# Patient Record
Sex: FEMALE | Employment: UNEMPLOYED | URBAN - METROPOLITAN AREA
[De-identification: names, ages, dates, MRNs, and addresses within clinical notes are randomized per-mention and may not be internally consistent; named-entity substitution may affect disease eponyms.]

---

## 2018-03-09 ENCOUNTER — HOSPITAL ENCOUNTER (OUTPATIENT)
Dept: PREADMISSION TESTING | Age: 38
Discharge: HOME | End: 2018-03-09
Payer: MEDICAID

## 2018-03-09 VITALS
OXYGEN SATURATION: 100 % | WEIGHT: 293 LBS | RESPIRATION RATE: 18 BRPM | HEIGHT: 67 IN | TEMPERATURE: 98.3 F | BODY MASS INDEX: 45.99 KG/M2 | HEART RATE: 80 BPM

## 2018-03-09 LAB
ALBUMIN SERPL-MCNC: 3.3 G/DL (ref 3.5–5)
ALBUMIN/GLOB SERPL: 0.9 {RATIO} (ref 1.1–2.2)
ALP SERPL-CCNC: 89 U/L (ref 45–117)
ALT SERPL-CCNC: 22 U/L (ref 12–78)
ANION GAP SERPL CALC-SCNC: 4 MMOL/L (ref 5–15)
AST SERPL-CCNC: 20 U/L (ref 15–37)
BILIRUB SERPL-MCNC: 0.2 MG/DL (ref 0.2–1)
BUN SERPL-MCNC: 8 MG/DL (ref 6–20)
BUN/CREAT SERPL: 7 (ref 12–20)
CALCIUM SERPL-MCNC: 9 MG/DL (ref 8.5–10.1)
CHLORIDE SERPL-SCNC: 107 MMOL/L (ref 97–108)
CO2 SERPL-SCNC: 32 MMOL/L (ref 21–32)
CREAT SERPL-MCNC: 1.18 MG/DL (ref 0.55–1.02)
ERYTHROCYTE [DISTWIDTH] IN BLOOD BY AUTOMATED COUNT: 16.2 % (ref 11.5–14.5)
GLOBULIN SER CALC-MCNC: 3.6 G/DL (ref 2–4)
GLUCOSE SERPL-MCNC: 95 MG/DL (ref 65–100)
HCT VFR BLD AUTO: 37.6 % (ref 35–47)
HGB BLD-MCNC: 11.8 G/DL (ref 11.5–16)
MCH RBC QN AUTO: 27 PG (ref 26–34)
MCHC RBC AUTO-ENTMCNC: 31.4 G/DL (ref 30–36.5)
MCV RBC AUTO: 86 FL (ref 80–99)
NRBC # BLD: 0 K/UL (ref 0–0.01)
NRBC BLD-RTO: 0 PER 100 WBC
PLATELET # BLD AUTO: 314 K/UL (ref 150–400)
PMV BLD AUTO: 9.2 FL (ref 8.9–12.9)
POTASSIUM SERPL-SCNC: 4.8 MMOL/L (ref 3.5–5.1)
PROT SERPL-MCNC: 6.9 G/DL (ref 6.4–8.2)
RBC # BLD AUTO: 4.37 M/UL (ref 3.8–5.2)
SODIUM SERPL-SCNC: 143 MMOL/L (ref 136–145)
WBC # BLD AUTO: 7.7 K/UL (ref 3.6–11)

## 2018-03-09 PROCEDURE — 80053 COMPREHEN METABOLIC PANEL: CPT | Performed by: ANESTHESIOLOGY

## 2018-03-09 PROCEDURE — 36415 COLL VENOUS BLD VENIPUNCTURE: CPT | Performed by: ANESTHESIOLOGY

## 2018-03-09 PROCEDURE — 85027 COMPLETE CBC AUTOMATED: CPT | Performed by: ANESTHESIOLOGY

## 2018-03-09 RX ORDER — METHADONE HYDROCHLORIDE 10 MG/ML
10 CONCENTRATE ORAL
COMMUNITY

## 2018-03-09 RX ORDER — ALPRAZOLAM 1 MG/1
1 TABLET ORAL
COMMUNITY

## 2018-03-09 NOTE — PERIOP NOTES
[facility]  Preoperative Instructions      Surgery Date 3/12/2018              Time of Arrival  10:00    1. On the day of your surgery, please report to the [facility] Entrance. If arriving prior to 7 AM please enter through the Emergency Room Entrance. 2. You must have a responsible adult to drive you to the hospital, stay at the hospital during your surgery and drive you home. You should have someone stay with you for the first 24 hours after your surgery. You should not drive a car for 24 hours following surgery. 3. Do not have anything to eat or drink ( including water, gum, mints, coffee, juice) after midnight . This may not apply to medications prescribed by your physician. Please note special instructions, if applicable. If you are currently taking Plavix, Coumadin, or other blood-thinning agents, contact your surgeon for instructions. 4. We recommend you do not drink any alcoholic beverages for 24 hours before and after your surgery. 5. Have a list of all current medications, including vitamins, herbal supplements and any other over the counter medications. Stop Asprin and non-steroidal anti-inflammatory drugs (I.e. Advil, Aleve) as directed by your surgeon's office. Stop all vitamins and herbal supplements seven days prior to your surgery. 6. Wear comfortable clothes. Wear glasses instead of contacts. Do not bring any money or jewelry. Do not wear make-up, particularly mascara, the morning of your surgery. Do not wear nail polish, particularly if you are having foot /hand surgery. Wear your hair loose or down, no ponytails, buns, emmy pins or clips. All body piercings must be removed. Please shower before surgery with an antibacterial soap (Safeguard/Dial) and use a clean towel. Do not apply any lotions, powders, cologne, perfume or deodorants afterward.     Do not shave the area around your surgical incision for at least two to three days prior to your surgery. If you wear glasses, contacts, dentures and/or hearing aids, they will be removed prior to surgery. 7. You should understand that if you do not follow these instructions your surgery may be cancelled. If your physical condition changes (I.e. fever, cold or flu) please contact your surgeon as soon as possible. 8. It is important that you be on time. If a situation occurs where you may be late, please call [phone]    9. If you are feeling sick before surgery, call your surgeon. He or she will tell you what to do. If you are sick on the day of your surgery please call [phone].     10. If you have any questions and or problems, please call [phone] (Pre-admission Testing). 11. Your surgery time may be subject to change. You will receive a phone call the evening before your surgery if your time changes. Special Instructions: may take xanax if needed the morning of surgery    MEDICATIONS TO TAKE THE MORNING OF SURGERY WITH A SIP OF WATER:      I understand a pre-operative phone call will be made to verify my surgery time.   In the event that I am not available, I give permission for a message to be left on my answering service and/or with another person?             ___________________      ___________________  _________  (Signature of Patient)          (Witness)                              (Date and Time)

## 2018-03-12 ENCOUNTER — ANESTHESIA (OUTPATIENT)
Dept: SURGERY | Age: 38
End: 2018-03-12
Payer: MEDICAID

## 2018-03-12 ENCOUNTER — HOSPITAL ENCOUNTER (OUTPATIENT)
Age: 38
Setting detail: OUTPATIENT SURGERY
Discharge: HOME | End: 2018-03-12
Attending: PODIATRIST | Admitting: PODIATRIST
Payer: MEDICAID

## 2018-03-12 ENCOUNTER — ANESTHESIA EVENT (OUTPATIENT)
Dept: SURGERY | Age: 38
End: 2018-03-12
Payer: MEDICAID

## 2018-03-12 VITALS
TEMPERATURE: 97.8 F | SYSTOLIC BLOOD PRESSURE: 139 MMHG | DIASTOLIC BLOOD PRESSURE: 88 MMHG | RESPIRATION RATE: 19 BRPM | HEART RATE: 85 BPM | OXYGEN SATURATION: 92 %

## 2018-03-12 PROBLEM — L60.0 INGROWN TOENAIL: Status: ACTIVE | Noted: 2018-03-12

## 2018-03-12 LAB
AMPHET UR QL SCN: NEGATIVE
BARBITURATES UR QL SCN: NEGATIVE
BENZODIAZ UR QL: POSITIVE
CANNABINOIDS UR QL SCN: NEGATIVE
COCAINE UR QL SCN: NEGATIVE
DRUG SCRN COMMENT,DRGCM: ABNORMAL
METHADONE UR QL: POSITIVE
OPIATES UR QL: NEGATIVE
PCP UR QL: NEGATIVE

## 2018-03-12 PROCEDURE — 76060000031 HC ANESTHESIA FIRST 0.5 HR: Performed by: PODIATRIST

## 2018-03-12 PROCEDURE — 76210000021 HC REC RM PH II 0.5 TO 1 HR: Performed by: PODIATRIST

## 2018-03-12 PROCEDURE — 74011000250 HC RX REV CODE- 250: Performed by: PODIATRIST

## 2018-03-12 PROCEDURE — 77030012406 HC DRN WND PENRS BARD -A: Performed by: PODIATRIST

## 2018-03-12 PROCEDURE — 74011250636 HC RX REV CODE- 250/636

## 2018-03-12 PROCEDURE — 80307 DRUG TEST PRSMV CHEM ANLYZR: CPT | Performed by: PODIATRIST

## 2018-03-12 PROCEDURE — 76010000154 HC OR TIME FIRST 0.5 HR: Performed by: PODIATRIST

## 2018-03-12 RX ORDER — KETOROLAC TROMETHAMINE 30 MG/ML
INJECTION, SOLUTION INTRAMUSCULAR; INTRAVENOUS AS NEEDED
Status: DISCONTINUED | OUTPATIENT
Start: 2018-03-12 | End: 2018-03-12 | Stop reason: HOSPADM

## 2018-03-12 RX ORDER — CEFAZOLIN SODIUM IN 0.9 % NACL 2 G/100 ML
PLASTIC BAG, INJECTION (ML) INTRAVENOUS
Status: DISCONTINUED
Start: 2018-03-12 | End: 2018-03-12 | Stop reason: HOSPADM

## 2018-03-12 RX ORDER — SODIUM CHLORIDE 9 MG/ML
50 INJECTION, SOLUTION INTRAVENOUS CONTINUOUS
Status: DISCONTINUED | OUTPATIENT
Start: 2018-03-12 | End: 2018-03-12 | Stop reason: HOSPADM

## 2018-03-12 RX ORDER — FENTANYL CITRATE 50 UG/ML
25 INJECTION, SOLUTION INTRAMUSCULAR; INTRAVENOUS
Status: DISCONTINUED | OUTPATIENT
Start: 2018-03-12 | End: 2018-03-12 | Stop reason: HOSPADM

## 2018-03-12 RX ORDER — CEFAZOLIN SODIUM 1 G/3ML
INJECTION, POWDER, FOR SOLUTION INTRAMUSCULAR; INTRAVENOUS
Status: DISCONTINUED
Start: 2018-03-12 | End: 2018-03-12 | Stop reason: HOSPADM

## 2018-03-12 RX ORDER — SODIUM CHLORIDE, SODIUM LACTATE, POTASSIUM CHLORIDE, CALCIUM CHLORIDE 600; 310; 30; 20 MG/100ML; MG/100ML; MG/100ML; MG/100ML
50 INJECTION, SOLUTION INTRAVENOUS CONTINUOUS
Status: DISCONTINUED | OUTPATIENT
Start: 2018-03-12 | End: 2018-03-12 | Stop reason: HOSPADM

## 2018-03-12 RX ORDER — SODIUM CHLORIDE 0.9 % (FLUSH) 0.9 %
5-10 SYRINGE (ML) INJECTION EVERY 8 HOURS
Status: DISCONTINUED | OUTPATIENT
Start: 2018-03-12 | End: 2018-03-12 | Stop reason: HOSPADM

## 2018-03-12 RX ORDER — SODIUM CHLORIDE 0.9 % (FLUSH) 0.9 %
5-10 SYRINGE (ML) INJECTION AS NEEDED
Status: DISCONTINUED | OUTPATIENT
Start: 2018-03-12 | End: 2018-03-12 | Stop reason: HOSPADM

## 2018-03-12 RX ORDER — IBUPROFEN 200 MG
800 TABLET ORAL
Qty: 90 TAB | Refills: 1 | Status: SHIPPED | OUTPATIENT
Start: 2018-03-12 | End: 2018-08-17

## 2018-03-12 RX ORDER — BUPIVACAINE HYDROCHLORIDE AND EPINEPHRINE 5; 5 MG/ML; UG/ML
20 INJECTION, SOLUTION EPIDURAL; INTRACAUDAL; PERINEURAL ONCE
Status: COMPLETED | OUTPATIENT
Start: 2018-03-12 | End: 2018-03-12

## 2018-03-12 RX ORDER — LIDOCAINE HYDROCHLORIDE 10 MG/ML
0.1 INJECTION, SOLUTION EPIDURAL; INFILTRATION; INTRACAUDAL; PERINEURAL AS NEEDED
Status: DISCONTINUED | OUTPATIENT
Start: 2018-03-12 | End: 2018-03-12 | Stop reason: HOSPADM

## 2018-03-12 RX ORDER — SULFAMETHOXAZOLE AND TRIMETHOPRIM 800; 160 MG/1; MG/1
1 TABLET ORAL 2 TIMES DAILY
Qty: 20 TAB | Refills: 1 | Status: SHIPPED | OUTPATIENT
Start: 2018-03-12 | End: 2018-08-17

## 2018-03-12 RX ORDER — SODIUM CHLORIDE 900 MG/100ML
INJECTION INTRAVENOUS
Status: DISCONTINUED
Start: 2018-03-12 | End: 2018-03-12 | Stop reason: HOSPADM

## 2018-03-12 RX ORDER — PROPOFOL 10 MG/ML
INJECTION, EMULSION INTRAVENOUS AS NEEDED
Status: DISCONTINUED | OUTPATIENT
Start: 2018-03-12 | End: 2018-03-12 | Stop reason: HOSPADM

## 2018-03-12 RX ORDER — MIDAZOLAM HYDROCHLORIDE 1 MG/ML
INJECTION, SOLUTION INTRAMUSCULAR; INTRAVENOUS AS NEEDED
Status: DISCONTINUED | OUTPATIENT
Start: 2018-03-12 | End: 2018-03-12 | Stop reason: HOSPADM

## 2018-03-12 RX ORDER — HYDROMORPHONE HYDROCHLORIDE 1 MG/ML
0.5 INJECTION, SOLUTION INTRAMUSCULAR; INTRAVENOUS; SUBCUTANEOUS
Status: DISCONTINUED | OUTPATIENT
Start: 2018-03-12 | End: 2018-03-12 | Stop reason: HOSPADM

## 2018-03-12 RX ADMIN — PROPOFOL 10 MG: 10 INJECTION, EMULSION INTRAVENOUS at 11:24

## 2018-03-12 RX ADMIN — PROPOFOL 10 MG: 10 INJECTION, EMULSION INTRAVENOUS at 11:26

## 2018-03-12 RX ADMIN — PROPOFOL 10 MG: 10 INJECTION, EMULSION INTRAVENOUS at 11:19

## 2018-03-12 RX ADMIN — PROPOFOL 10 MG: 10 INJECTION, EMULSION INTRAVENOUS at 11:20

## 2018-03-12 RX ADMIN — PROPOFOL 10 MG: 10 INJECTION, EMULSION INTRAVENOUS at 11:22

## 2018-03-12 RX ADMIN — PROPOFOL 10 MG: 10 INJECTION, EMULSION INTRAVENOUS at 11:25

## 2018-03-12 RX ADMIN — PROPOFOL 10 MG: 10 INJECTION, EMULSION INTRAVENOUS at 11:21

## 2018-03-12 RX ADMIN — MIDAZOLAM HYDROCHLORIDE 2 MG: 1 INJECTION, SOLUTION INTRAMUSCULAR; INTRAVENOUS at 11:07

## 2018-03-12 RX ADMIN — KETOROLAC TROMETHAMINE 15 MG: 30 INJECTION, SOLUTION INTRAMUSCULAR; INTRAVENOUS at 11:31

## 2018-03-12 RX ADMIN — PROPOFOL 100 MG: 10 INJECTION, EMULSION INTRAVENOUS at 11:18

## 2018-03-12 RX ADMIN — PROPOFOL 10 MG: 10 INJECTION, EMULSION INTRAVENOUS at 11:27

## 2018-03-12 NOTE — ANESTHESIA PREPROCEDURE EVALUATION
Anesthetic History   No history of anesthetic complications            Review of Systems / Medical History  Patient summary reviewed and pertinent labs reviewed    Pulmonary          Smoker         Neuro/Psych         Psychiatric history     Cardiovascular                  Exercise tolerance: <4 METS     GI/Hepatic/Renal  Within defined limits              Endo/Other        Morbid obesity and arthritis     Other Findings              Physical Exam    Airway  Mallampati: III    Neck ROM: short neck   Mouth opening: Normal     Cardiovascular    Rhythm: regular  Rate: normal         Dental    Dentition: Upper partial plate     Pulmonary  Breath sounds clear to auscultation               Abdominal  GI exam deferred       Other Findings            Anesthetic Plan    ASA: 3  Anesthesia type: MAC            Anesthetic plan and risks discussed with: Patient

## 2018-03-12 NOTE — H&P
History and Physical    Subjective:     Kendell Conde is a 42 y.o.  female who presents with painful 1st toenails both feet. Onset of symptoms was gradual with rapidly worsening course since that time. The pain is located 1st toenail both feet. Patient describes the pain as continuous and rated as severe. . Patient denies any truama. Symptoms are aggravated by shoes. Past Medical History:   Diagnosis Date    Arthritis     left knee    Morbid obesity ([address]     Psychiatric disorder       Past Surgical History:   Procedure Laterality Date    HX GYN      partial hysterectomy     History reviewed. No pertinent family history. Social History   Substance Use Topics    Smoking status: Current Every Day Smoker     Packs/day: 1.00    Smokeless tobacco: Never Used    Alcohol use Yes      Comment: social       Prior to Admission medications    Medication Sig Start Date End Date Taking? Authorizing Provider   ALPRAZolam Edison Olmos) 1 mg tablet Take 1 mg by mouth. Indications: anxiety    Historical Provider   methadone (DOLOPHINE) 10 mg/mL solution Take 10 mg by mouth every eight (8) hours as needed for Pain. Indications: Chronic Pain, 120mg liquid form    Historical Provider     No Known Allergies     Review of Systems:  A comprehensive review of systems was negative. Objective: Intake and Output:            Physical Exam:   Visit Vitals    LMP 03/09/2018     General:  Alert, cooperative, no distress, appears stated age. Head:  Normocephalic, without obvious abnormality, atraumatic. Eyes:  Conjunctivae/corneas clear. PERRL,    Ears:  Normal external  ear canals both ears. Nose: Nares normal. Septum midline. Mucosa normal. No drainage or sinus tenderness. Throat: Lips, mucosa, and tongue normal. Teeth and gums normal.   Neck: Supple, symmetrical, trachea midline, no adenopathy, thyroid: no enlargement/tenderness/nodules, no carotid bruit and no JVD.    Back:   Symmetric, no curvature. ROM normal. No CVA tenderness. Lungs:   Clear to auscultation bilaterally. Chest wall:  No tenderness or deformity. Heart:  Regular rate and rhythm, S1, S2 normal, no murmur, click, rub or gallop. Extremities: Extremities normal, atraumatic, no cyanosis or edema. Pulses: 2+ and symmetric all extremities. Skin: Skin color, texture, turgor normal. No rashes or lesions. Lymph nodes: Cervical, supraclavicular, and axillary nodes normal.   Neurologic: CNII-XII intact. Normal strength, sensation and reflexes throughout. LOWER EXTREMITIES:  Palpable pedal pulses with epicritic sensation intact  Good muscle  Toenails 1-5 bilateral thick and discolored  Dystrophic, incurvated painful 1st toenail bilateral     Data Review:   No results found for this or any previous visit (from the past 24 hour(s)). Assessment:     Principal Problem:    Ingrown toenail (3/12/2018)      Overview: DX: ingrown toenail 1st toe both feet        Plan:   Scheduled for matrixectomy 1st toenail both feet. Discussed risks, benefit, expected outcome as well as post op course.   All questions answered      Signed By: Jesica Bass DPM     March 12, 2018

## 2018-03-12 NOTE — ANESTHESIA POSTPROCEDURE EVALUATION
Post-Anesthesia Evaluation and Assessment    Patient: Baron Kam MRN: [id number]  SSN: [id number]    YOB: 1980  Age: 42 y.o. Sex: female       Cardiovascular Function/Vital Signs  Visit Vitals    /88    Pulse 85    Temp 36.6 °C (97.8 °F)    Resp 19    SpO2 92%       Patient is status post MAC anesthesia for Procedure(s):  MATRIXECTOMY FIRST TOENAIL BILATERAL . Nausea/Vomiting: None    Postoperative hydration reviewed and adequate. Pain:  Pain Scale 1: Numeric (0 - 10) (03/12/18 1138)  Pain Intensity 1: 0 (03/12/18 1138)   Managed    Neurological Status:   Neuro (WDL): Within Defined Limits (03/12/18 1138)   At baseline    Mental Status and Level of Consciousness: Arousable    Pulmonary Status:   O2 Device: Room air (03/12/18 1140)   Adequate oxygenation and airway patent    Complications related to anesthesia: None    Post-anesthesia assessment completed.  No concerns    Signed By: Major Ward MD     March 12, 2018

## 2018-03-12 NOTE — ROUTINE PROCESS
Report received from Dr. Chelle Dos Santos and Tita Frazier RN.  Anesthesin type, drugs, patient history, complications, estimated blood loss, vital signs, and pain management was reviewed

## 2018-03-12 NOTE — BRIEF OP NOTE
BRIEF OPERATIVE NOTE    Date of Procedure: 3/12/2018   Preoperative Diagnosis: INGROWN TOENAILS FIRST TOE BILATERAL  Postoperative Diagnosis: INGROWN TOENAILS FIRST TOE BILATERAL    Procedure(s):  MATRIXECTOMY FIRST TOENAIL BILATERAL   Surgeon(s) and Role:     * Nixon Rojas DPM - Primary         Assistant Staff: None      Surgical Staff:  Circ-1: Axel Ramires  Scrub RN-1: Maximino Acevedo RN  Event Time In   Incision Start 1120   Incision Close 1130     Anesthesia: MAC   Estimated Blood Loss:min  Specimens: * No specimens in log *   Findings: deformed toenails  Complications: none  Implants: * No implants in log *

## 2018-03-12 NOTE — OP NOTES
[facility]  OPERATIVE REPORT    Dominic Livingston  MR#: [id number]  : 1980  ACCOUNT #: [de-identified]   DATE OF SERVICE: 2018    PREOPERATIVE DIAGNOSIS:  Painful ingrown toenails first toe bilateral.    POSTOPERATIVE DIAGNOSIS:  Painful ingrown toenails first toe bilateral.    PROCEDURE PERFORMED:  Matrixectomy first toenail bilateral.    SURGEON:  Pablo De Paz DPM.     ANESTHESIA:  IV sedation with local.    ESTIMATED BLOOD LOSS:  Minimal.    SPECIMENS REMOVED:  None. COMPLICATIONS:  none    IMPLANTS: none    INDICATIONS:  This 78-year-old white female presents with chronic painful ingrown toenails. Conservative therapy has failed to alleviate the patient of her symptoms. At this time, surgical intervention is opted as a treatment choice by the patient. Medical history and physical has been performed. Patient released for surgery. Physical examination revealed palpable pedal pulses with fairly good muscle strength in lower extremities bilateral.  Epicritic sensation intact. Toenails 1 through 5 bilateral are thickened, discolored. First toenail bilateral is grossly thickened, incurvated and painful. DESCRIPTION OF PROCEDURE:  The patient was brought to the operating room and placed on the operating table in supine position. Both feet were now prepped and draped in usual sterile manner. A successful timeout was completed. Next, each digit was anesthetized while under IV sedation using 10 mL of 0.5% Marcaine with epinephrine in each first toe bilateral.  Next, a Penrose tourniquet was placed around each first toe bilateral.  Using a hemostat, the nail first and left was removed in total including the matrix. Proximal nail fold both digits first toe bilateral, 89% phenol was applied. Bacitracin followed by dry sterile gauze were placed on each digit and the Penrose tourniquet was released.   The patient appeared to tolerate anesthesia and procedure well and she was transferred from the operating room to recovery with all vital signs stable and vascular status intact to all digits 1 through 5 both feet. Preoperatively, she was given 2 grams Ancef IV.       ANTONIETTA Joseph / [employer]  D: 03/12/2018 11:44     T: 03/12/2018 14:29  JOB #: [id number]

## 2018-08-17 ENCOUNTER — HOSPITAL ENCOUNTER (EMERGENCY)
Age: 38
Discharge: HOME | End: 2018-08-17
Attending: EMERGENCY MEDICINE | Admitting: EMERGENCY MEDICINE
Payer: MEDICAID

## 2018-08-17 VITALS
BODY MASS INDEX: 45.99 KG/M2 | HEART RATE: 82 BPM | RESPIRATION RATE: 18 BRPM | DIASTOLIC BLOOD PRESSURE: 85 MMHG | WEIGHT: 293 LBS | TEMPERATURE: 98.5 F | HEIGHT: 67 IN | OXYGEN SATURATION: 97 % | SYSTOLIC BLOOD PRESSURE: 118 MMHG

## 2018-08-17 DIAGNOSIS — T14.8XXA ABRASION OF SKIN WITH INFECTION: ICD-10-CM

## 2018-08-17 DIAGNOSIS — L03.116 CELLULITIS OF LEFT LOWER EXTREMITY: ICD-10-CM

## 2018-08-17 DIAGNOSIS — H60.503 ACUTE OTITIS EXTERNA OF BOTH EARS, UNSPECIFIED TYPE: Primary | ICD-10-CM

## 2018-08-17 DIAGNOSIS — L08.9 ABRASION OF SKIN WITH INFECTION: ICD-10-CM

## 2018-08-17 DIAGNOSIS — M79.89 FOOT SWELLING: ICD-10-CM

## 2018-08-17 PROCEDURE — 93970 EXTREMITY STUDY: CPT

## 2018-08-17 PROCEDURE — 99282 EMERGENCY DEPT VISIT SF MDM: CPT

## 2018-08-17 RX ORDER — CEPHALEXIN 500 MG/1
500 CAPSULE ORAL 4 TIMES DAILY
Qty: 28 CAP | Refills: 0 | Status: SHIPPED | OUTPATIENT
Start: 2018-08-17 | End: 2018-08-24

## 2018-08-17 RX ORDER — IBUPROFEN 600 MG/1
600 TABLET ORAL
Qty: 20 TAB | Refills: 0 | Status: SHIPPED | OUTPATIENT
Start: 2018-08-17

## 2018-08-17 RX ORDER — NEOMYCIN SULFATE, POLYMYXIN B SULFATE, HYDROCORTISONE 3.5; 10000; 1 MG/ML; [USP'U]/ML; MG/ML
3-4 SOLUTION/ DROPS AURICULAR (OTIC) 4 TIMES DAILY
Qty: 1 BOTTLE | Refills: 0 | Status: SHIPPED | OUTPATIENT
Start: 2018-08-17

## 2018-08-17 NOTE — ED NOTES
Pt reports bilateral lower extremity edema \"all the time\", symptoms are intermittent, this episode x 1 week; pt also reports bilateral ear drainage for over a month since swimming at [facility]      Emergency [address] is developed from the Nursing assessment and Emergency Department Attending provider initial evaluation. The plan of care may be reviewed in the ED Provider note.     The Plan of Care was developed with the following considerations:   Patient / Family readiness to learn indicated by:verbalized understanding  Persons(s) to be included in education: patient  Barriers to Learning/Limitations:No    Signed     Oliverio James RN    8/17/2018   1:02 PM

## 2018-08-17 NOTE — DISCHARGE INSTRUCTIONS
Cellulitis: Care Instructions  Your Care Instructions    Cellulitis is a skin infection caused by bacteria, most often strep or staph. It often occurs after a break in the skin from a scrape, cut, bite, or puncture, or after a rash. Cellulitis may be treated without doing tests to find out what caused it. But your doctor may do tests, if needed, to look for a specific bacteria, like methicillin-resistant Staphylococcus aureus (MRSA). The doctor has checked you carefully, but problems can develop later. If you notice any problems or new symptoms, get medical treatment right away. Follow-up care is a key part of your treatment and safety. Be sure to make and go to all appointments, and call your doctor if you are having problems. It's also a good idea to know your test results and keep a list of the medicines you take. How can you care for yourself at home? · Take your antibiotics as directed. Do not stop taking them just because you feel better. You need to take the full course of antibiotics. · Prop up the infected area on pillows to reduce pain and swelling. Try to keep the area above the level of your heart as often as you can. · If your doctor told you how to care for your wound, follow your doctor's instructions. If you did not get instructions, follow this general advice:  ¨ Wash the wound with clean water 2 times a day. Don't use hydrogen peroxide or alcohol, which can slow healing. ¨ You may cover the wound with a thin layer of petroleum jelly, such as Vaseline, and a nonstick bandage. ¨ Apply more petroleum jelly and replace the bandage as needed. · Be safe with medicines. Take pain medicines exactly as directed. ¨ If the doctor gave you a prescription medicine for pain, take it as prescribed. ¨ If you are not taking a prescription pain medicine, ask your doctor if you can take an over-the-counter medicine.   To prevent cellulitis in the future  · Try to prevent cuts, scrapes, or other injuries to your skin. Cellulitis most often occurs where there is a break in the skin. · If you get a scrape, cut, mild burn, or bite, wash the wound with clean water as soon as you can to help avoid infection. Don't use hydrogen peroxide or alcohol, which can slow healing. · If you have swelling in your legs (edema), support stockings and good skin care may help prevent leg sores and cellulitis. · Take care of your feet, especially if you have diabetes or other conditions that increase the risk of infection. Wear shoes and socks. Do not go barefoot. If you have athlete's foot or other skin problems on your feet, talk to your doctor about how to treat them. When should you call for help? Call your doctor now or seek immediate medical care if:    · You have signs that your infection is getting worse, such as:  ¨ Increased pain, swelling, warmth, or redness. ¨ Red streaks leading from the area. ¨ Pus draining from the area. ¨ A fever.     · You get a rash.    Watch closely for changes in your health, and be sure to contact your doctor if:    · You do not get better as expected. Where can you learn more? Go to http://anna-shahram.info/. Rosetta Barbosa in the search box to learn more about \"Cellulitis: Care Instructions. \"  Current as of: May 10, 2017  Content Version: 11.7  © 8742-1743 [employer]. Care instructions adapted under license by [employer] (which disclaims liability or warranty for this information). If you have questions about a medical condition or this instruction, always ask your healthcare professional. [place] [address] any warranty or liability for your use of this information. Swimmer's Ear: Care Instructions  Your Care Instructions    Swimmer's ear (otitis externa) is inflammation or infection of the ear canal. This is the passage that leads from the outer ear to the eardrum.  Any water, sand, or other debris that gets into the ear canal and stays there can cause swimmer's ear. Putting cotton swabs or other items in the ear to clean it can also cause this problem. Swimmer's ear can be very painful. But you can treat the pain and infection with medicines. You should feel better in a few days. Follow-up care is a key part of your treatment and safety. Be sure to make and go to all appointments, and call your doctor if you are having problems. It's also a good idea to know your test results and keep a list of the medicines you take. How can you care for yourself at home? Cleaning and care  · Use antibiotic drops as your doctor directs. · Do not insert ear drops (other than the antibiotic ear drops) or anything else into the ear unless your doctor has told you to. · Avoid getting water in the ear until the problem clears up. Use cotton lightly coated with petroleum jelly as an earplug. Do not use plastic earplugs. · Use a hair dryer set on low to carefully dry the ear after you shower. · To ease ear pain, hold a warm washcloth against your ear. · Take pain medicines exactly as directed. ¨ If the doctor gave you a prescription medicine for pain, take it as prescribed. ¨ If you are not taking a prescription pain medicine, ask your doctor if you can take an over-the-counter medicine. Inserting ear drops  · Warm the drops to body temperature by rolling the container in your hands. Or you can place it in a cup of warm water for a few minutes. · Lie down, with your ear facing up. · Place drops inside the ear. Follow your doctor's instructions (or the directions on the label) for how many drops to use. Gently wiggle the outer ear or pull the ear up and back to help the drops get into the ear. · It's important to keep the liquid in the ear canal for 3 to 5 minutes. When should you call for help?   Call your doctor now or seek immediate medical care if:    · You have a new or higher fever.     · You have new or worse pain, swelling, warmth, or redness around or behind your ear.     · You have new or increasing pus or blood draining from your ear.    Watch closely for changes in your health, and be sure to contact your doctor if:    · You are not getting better after 2 days (48 hours). Where can you learn more? Go to http://anna-shahram.info/. Enter C706 in the search box to learn more about \"Swimmer's Ear: Care Instructions. \"  Current as of: May 12, 2017  Content Version: 11.7  © 4470-2264 [employer]. Care instructions adapted under license by [employer] (which disclaims liability or warranty for this information). If you have questions about a medical condition or this instruction, always ask your healthcare professional. [address] any warranty or liability for your use of this information.

## 2018-08-17 NOTE — ED NOTES
Discharge summary and discharge medications reviewed with patient and appropriate educational materials and side effects teaching were provided. patient  Given 0 paper prescriptions and 3 electronic prescriptions sent to pt's listed pharmacy. Patient verbalized understanding of the importance of discussing medications with his or her physician or clinic they will be following up with. No si/s of acute distress prior to discharge. Patient offered wheelchair from treatment area to hospital entrance, patient declined wheelchair.

## 2018-08-17 NOTE — PROCEDURES
[facility]  *** FINAL REPORT ***    Name: Amador Chawla  MRN: [id number]  : 30 Dec 1980  HIS Order #: [id number]  [address] [facility] Visit #: [id number]  Date: 17 Aug 2018    TYPE OF TEST: Peripheral Venous Testing    REASON FOR TEST  Limb swelling    Right Leg:-  Deep venous thrombosis:           No  Superficial venous thrombosis:    No  Deep venous insufficiency:        Not examined  Superficial venous insufficiency: Not examined    Left Leg:-  Deep venous thrombosis:           No  Superficial venous thrombosis:    No  Deep venous insufficiency:        Not examined  Superficial venous insufficiency: Not examined      INTERPRETATION/FINDINGS  Right leg :  1. Deep vein(s) visualized include the common femoral, deep femoral,  proximal femoral, mid femoral, distal femoral, popliteal(above knee),  popliteal(fossa), popliteal(below knee), posterior tibial and peroneal   veins. 2. No evidence of deep venous thrombosis detected in the veins  visualized. 3. No evidence of superficial thrombosis detected. Left leg :  1. Deep vein(s) visualized include the common femoral, deep femoral,  proximal femoral, mid femoral, distal femoral, popliteal(above knee),  popliteal(fossa), popliteal(below knee), posterior tibial and peroneal   veins. 2. No evidence of deep venous thrombosis detected in the veins  visualized. 3. No evidence of superficial thrombosis detected. ADDITIONAL COMMENTS    I have personally reviewed the data relevant to the interpretation of  this  study. TECHNOLOGIST: Herbert Castro RVT  Signed: 2018 02:03 PM    PHYSICIAN: Radha Rosas MD  Signed: 2018 02:43 PM

## 2018-08-18 NOTE — ED PROVIDER NOTES
EMERGENCY DEPARTMENT HISTORY AND PHYSICAL EXAM    Date: 8/17/2018  Patient Name: Antonio Hernandez    History of Presenting Illness     Chief Complaint   Patient presents with    Ear Drainage     bilaterally since going to [facility] on July 4th    Foot Swelling     bilaterally x1 wk          History Provided By: Patient    Chief Complaint: ear drainage  Duration: 6 Weeks  Timing:  Acute  Location: R and L ear  Quality: Aching  Severity: 3 out of 10  Modifying Factors: none  Associated Symptoms: denies any other associated signs or symptoms      HPI: Antonio Hernandez is a 42 y.o. female with a PMH of No significant past medical history and DVT left leg who presents with bilateral ear pain and drainage onset July 4th when she went to [facility]. Patient also re ports swelling of lower extremities. States she had a clot in her left leg in the past. States legs are swollen and red. Has not treated the symptoms. PCP: Bernardino Howard MD    Current Outpatient Prescriptions   Medication Sig Dispense Refill    neomycin-polymyxin-hydrocortisone (CORTISPORIN) otic solution Administer 3-4 Drops into each ear four (4) times daily. 1 Bottle 0    cephALEXin (KEFLEX) 500 mg capsule Take 1 Cap by mouth four (4) times daily for 7 days. 28 Cap 0    ibuprofen (MOTRIN) 600 mg tablet Take 1 Tab by mouth every six (6) hours as needed for Pain. 20 Tab 0    ALPRAZolam (XANAX) 1 mg tablet Take 1 mg by mouth. Indications: anxiety      methadone (DOLOPHINE) 10 mg/mL solution Take 10 mg by mouth every eight (8) hours as needed for Pain. Indications: Chronic Pain, 120mg liquid form         Past History     Past Medical History:  Past Medical History:   Diagnosis Date    Arthritis     left knee    Morbid obesity ([address]     Psychiatric disorder        Past Surgical History:  Past Surgical History:   Procedure Laterality Date    HX GYN      partial hysterectomy       Family History:  History reviewed.  No pertinent family history. Social History:  Social History   Substance Use Topics    Smoking status: Current Every Day Smoker     Packs/day: 1.00    Smokeless tobacco: Never Used    Alcohol use No      Comment: social       Allergies:  No Known Allergies      Review of Systems   Review of Systems   Constitutional: Negative for fatigue and fever. HENT: Positive for ear discharge and ear pain. Respiratory: Negative for shortness of breath and wheezing. Cardiovascular: Positive for leg swelling. Negative for chest pain and palpitations. Gastrointestinal: Negative for abdominal pain. Musculoskeletal: Negative for arthralgias, myalgias, neck pain and neck stiffness. Skin: Negative for pallor and rash. Neurological: Negative for dizziness, tremors, weakness and headaches. Hematological: Negative for adenopathy. Psychiatric/Behavioral: Negative for agitation and behavioral problems. All other systems reviewed and are negative. Physical Exam     Vitals:    08/17/18 1210   BP: 118/85   Pulse: 82   Resp: 18   Temp: 98.5 °F (36.9 °C)   SpO2: 97%   Weight: 146 kg (321 lb 14 oz)   Height: 5' 7\" (1.702 m)     Physical Exam   Constitutional: She is oriented to person, place, and time. She appears well-developed and well-nourished. No distress. HENT:   Head: Normocephalic and atraumatic. Right Ear: There is drainage. Left Ear: There is drainage. Nose: Nose normal.   Mouth/Throat: Oropharynx is clear and moist.   Erythema left external auditory canal   Eyes: Conjunctivae are normal.   Neck: Normal range of motion. Neck supple. Cardiovascular: Normal rate, regular rhythm and normal heart sounds. Pulmonary/Chest: Effort normal and breath sounds normal. No respiratory distress. She has no wheezes. Abdominal: Soft. Bowel sounds are normal. There is no tenderness. Musculoskeletal: Normal range of motion.  Edema: +1 edema feet and lower extremities    Lymphadenopathy:     She has no cervical adenopathy. Neurological: She is alert and oriented to person, place, and time. No cranial nerve deficit. Coordination normal.   Skin: Skin is warm and dry. No rash noted. Rubor warmth lower extremities   Psychiatric: She has a normal mood and affect. Her behavior is normal. Judgment and thought content normal.   Nursing note and vitals reviewed. Diagnostic Study Results     Labs -   No results found for this or any previous visit (from the past 12 hour(s)). Radiologic Studies -   DUPLEX LOWER EXT VENOUS BILAT   Final Result        CT Results  (Last 48 hours)    None        CXR Results  (Last 48 hours)    None            Medical Decision Making   I am the first provider for this patient. I reviewed the vital signs, available nursing notes, past medical history, past surgical history, family history and social history. Vital Signs-Reviewed the patient's vital signs. Records Reviewed: Nursing Notes    ED Course:   stable  Disposition:  home    DISCHARGE NOTE:         Care plan outlined and precautions discussed. Patient has no new complaints, changes, or physical findings. Results of tests were reviewed with the patient. All medications were reviewed with the patient; will d/c home with neomycin polymixin otic keflex. All of pt's questions and concerns were addressed. Patient was instructed and agrees to follow up with PCP, as well as to return to the ED upon further deterioration. Patient is ready to go home. Follow-up Information     Follow up With Details Comments Vanessa Skaggs MD In 2 days  2  [address] [place] [address]  [phone]            Discharge Medication List as of 8/17/2018  2:41 PM      START taking these medications    Details   neomycin-polymyxin-hydrocortisone (CORTISPORIN) otic solution Administer 3-4 Drops into each ear four (4) times daily. , Normal, Disp-1 Bottle, R-0      cephALEXin (KEFLEX) 500 mg capsule Take 1 Cap by mouth four (4) times daily for 7 days., Normal, Disp-28 Cap, R-0      ibuprofen (MOTRIN) 600 mg tablet Take 1 Tab by mouth every six (6) hours as needed for Pain., Normal, Disp-20 Tab, R-0         CONTINUE these medications which have NOT CHANGED    Details   ALPRAZolam (XANAX) 1 mg tablet Take 1 mg by mouth. Indications: anxiety, Historical Med      methadone (DOLOPHINE) 10 mg/mL solution Take 10 mg by mouth every eight (8) hours as needed for Pain. Indications: Chronic Pain, 120mg liquid form, Historical Med             Provider Notes (Medical Decision Making):   DDX DVT dependent edema OE AOM  Procedures:  Procedures        Diagnosis     Clinical Impression:   1. Acute otitis externa of both ears, unspecified type    2. Abrasion of skin with infection    3. Cellulitis of left lower extremity    4.  Foot swelling

## (undated) DEVICE — PREP CHLORAPREP 10.5 ML ORG --

## (undated) DEVICE — Z DISCONTINUEDSOLUTION PREP 2OZ 10% POVIDONE IOD SCR CAP BTL

## (undated) DEVICE — BASIC PACK: Brand: CONVERTORS

## (undated) DEVICE — SYR 10ML LUER LOK 1/5ML GRAD --

## (undated) DEVICE — SYR IRR BLB 2OZ DISP BLU STRL -- CONVERT TO ITEM 357637

## (undated) DEVICE — STERILE POLYISOPRENE POWDER-FREE SURGICAL GLOVES: Brand: PROTEXIS

## (undated) DEVICE — STRETCH BANDAGE ROLL: Brand: DERMACEA

## (undated) DEVICE — GAUZE SPONGES,12 PLY: Brand: CURITY

## (undated) DEVICE — HANDLE LT SNAP ON ULT DURABLE LENS FOR TRUMPF ALC DISPOSABLE

## (undated) DEVICE — INFECTION CONTROL KIT SYS

## (undated) DEVICE — DRAIN WND PENRS RADPQ 0.25X18 -- CONVERT TO ITEM 360112

## (undated) DEVICE — NEEDLE HYPO 25GA L1.5IN BVL ORIENTED ECLIPSE